# Patient Record
Sex: FEMALE | Race: BLACK OR AFRICAN AMERICAN | NOT HISPANIC OR LATINO | URBAN - METROPOLITAN AREA
[De-identification: names, ages, dates, MRNs, and addresses within clinical notes are randomized per-mention and may not be internally consistent; named-entity substitution may affect disease eponyms.]

---

## 2018-06-27 ENCOUNTER — EMERGENCY (EMERGENCY)
Facility: HOSPITAL | Age: 73
LOS: 1 days | Discharge: HOME | End: 2018-06-27
Attending: EMERGENCY MEDICINE
Payer: SELF-PAY

## 2018-06-27 VITALS
DIASTOLIC BLOOD PRESSURE: 106 MMHG | OXYGEN SATURATION: 99 % | TEMPERATURE: 99 F | SYSTOLIC BLOOD PRESSURE: 196 MMHG | RESPIRATION RATE: 17 BRPM | HEART RATE: 72 BPM

## 2018-06-27 VITALS
OXYGEN SATURATION: 99 % | TEMPERATURE: 98 F | WEIGHT: 190.04 LBS | DIASTOLIC BLOOD PRESSURE: 96 MMHG | HEART RATE: 76 BPM | HEIGHT: 67 IN | SYSTOLIC BLOOD PRESSURE: 203 MMHG | RESPIRATION RATE: 18 BRPM

## 2018-06-27 PROCEDURE — 76377 3D RENDER W/INTRP POSTPROCES: CPT

## 2018-06-27 PROCEDURE — 99284 EMERGENCY DEPT VISIT MOD MDM: CPT | Mod: 25

## 2018-06-27 PROCEDURE — 21310: CPT

## 2018-06-27 PROCEDURE — 70486 CT MAXILLOFACIAL W/O DYE: CPT | Mod: 26

## 2018-06-27 PROCEDURE — 70486 CT MAXILLOFACIAL W/O DYE: CPT

## 2018-06-27 PROCEDURE — 70450 CT HEAD/BRAIN W/O DYE: CPT

## 2018-06-27 PROCEDURE — 70450 CT HEAD/BRAIN W/O DYE: CPT | Mod: 26

## 2018-06-27 RX ORDER — AMLODIPINE BESYLATE 2.5 MG/1
5 TABLET ORAL ONCE
Qty: 0 | Refills: 0 | Status: COMPLETED | OUTPATIENT
Start: 2018-06-27 | End: 2018-06-27

## 2018-06-27 RX ORDER — AMLODIPINE BESYLATE 2.5 MG/1
1 TABLET ORAL
Qty: 30 | Refills: 0 | OUTPATIENT
Start: 2018-06-27 | End: 2018-07-26

## 2018-06-27 RX ADMIN — AMLODIPINE BESYLATE 5 MILLIGRAM(S): 2.5 TABLET ORAL at 19:40

## 2018-06-27 NOTE — ED PROVIDER NOTE - PROGRESS NOTE DETAILS
Dr. Sawyer Note: s/o from Dr. Peng pending ct scans which are reviewed with patient and copy given to patient and family, refer to plastics, no bleeding currently, appears well, stable for dc home. Dr. Sawyer Note: pt with noted elevated BP, pt was on BP meds last year but not anymore, will start pt on Norvasc, f/u pcp, no ACS/Neuro sxs, pt aware of risks and will f/u PCP.

## 2018-06-27 NOTE — ED PROVIDER NOTE - DATE/TIME 1
, patient received from day shift. Neuro; Alert and oriented to self situation but not all the time, do not know the , GCS; Eye; 4, verbal; 4, motor; 6.  Respiratory; Sat 100% on NC 4 L/min > decreased to 3 L/min, diminished lower lung sounds. Cardiac; NSR, 100 B/min, NIBP 112/71 mmHg,  GI; DM diet, Abd soft with active bowel sound,   Renal; void freely. Skin; cast at right leg  Endo; SSI Q 6 hr,   Lines; Left PICC,   Code; Full. Lab; WBC; 8.6, HGB; 9.5, platelet; 344 , Na; 388 , K; 3.7 , BUN; 19, Crea; 0.79 ,  DVT; SCD left leg & Lovenox. Plan; Titrate oxygen as tolerated, pain and agitation management CIWA 12, follow lab tomorrow, sitter at bedside for patient safety. ; CIWA now 5, to follow up.  0700. Patient been awake all the night, CIWA started at 12 in morning 4, Ativan 1 mg IV PRN for agitation been given twice, patient restlessness been less now, RA sat been above 95%. Need instruction to do incentive spirometry, kept calling her , once she said' I can not tolerate more give me morphine',  No lab today  Bedside and Verbal shift change report given to [address] [place] [address] (oncoming nurse) by Kenisha Coronado RN (offgoing nurse). Report included the following information SBAR, Kardex, Procedure Summary, Intake/Output, MAR, Accordion, Recent Results and Cardiac Rhythm NSR.        Problem: Falls - Risk of  Goal: *Absence of falls  Outcome: Progressing Towards Goal  .  Goal: *Knowledge of fall prevention  Outcome: Progressing Towards Goal  .    Problem: Patient Education: Go to Patient Education Activity  Goal: Patient/Family Education  Outcome: Progressing Towards Goal  .    Problem: Pressure Ulcer - Risk of  Goal: *Prevention of pressure ulcer  Outcome: Progressing Towards Goal  .    Problem: Patient Education: Go to Patient Education Activity  Goal: Patient/Family Education  Outcome: Progressing Towards Goal  .    Problem: Breathing Pattern - Ineffective  Goal: *Absence of hypoxia  Outcome: Progressing Towards Goal  .  Goal: *Use of effective breathing techniques  Outcome: Progressing Towards Goal  . 27-Jun-2018 19:18
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
## 2018-06-27 NOTE — ED PROVIDER NOTE - ENMT, MLM
Airway patent. Dried blood In nostril. No septal hematoma. Mouth with normal mucosa. Throat has no vesicles, no oropharyngeal exudates and uvula is midline. No pre-auricular ecchymosis or otorrhea. No rhinorrhea. Tender over nasal ridge c/ slight swelling s/ crepitus. No midface instability or jaw malalignment.

## 2018-06-27 NOTE — ED PROVIDER NOTE - CARE PLAN
Principal Discharge DX:	Closed head injury, initial encounter  Secondary Diagnosis:	Nasal bone fracture

## 2018-06-27 NOTE — ED PROVIDER NOTE - MUSCULOSKELETAL, MLM
Spine appears normal, range of motion is not limited, no muscle or joint tenderness. No midline c-spine ttp or palpable bony deformity. FROM.
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
## 2022-01-03 NOTE — ED PROVIDER NOTE - EYES, MLM
Detail Level: Detailed Depth Of Biopsy: dermis Was A Bandage Applied: Yes Size Of Lesion In Cm: 0.9 X Size Of Lesion In Cm: 0 Biopsy Type: H and E Biopsy Method: Dermablade Anesthesia Type: 1% lidocaine with epinephrine Anesthesia Volume In Cc: 0.8 Hemostasis: Electrodesiccation and Aluminum Chloride Wound Care: Petrolatum Dressing: bandage Destruction After The Procedure: No Type Of Destruction Used: Curettage Curettage Text: The wound bed was treated with curettage after the biopsy was performed. Cryotherapy Text: The wound bed was treated with cryotherapy after the biopsy was performed. Electrodesiccation Text: The wound bed was treated with electrodesiccation after the biopsy was performed. Electrodesiccation And Curettage Text: The wound bed was treated with electrodesiccation and curettage after the biopsy was performed. Silver Nitrate Text: The wound bed was treated with silver nitrate after the biopsy was performed. Lab: [id number] Lab Facility: [id number] Notes (To The Dermatopathologist): POOL Consent: Verbal consent was obtained and risks were reviewed including but not limited to scarring, infection, bleeding, scabbing, incomplete removal, nerve damage and allergy to anesthesia. Post-Care Instructions: I reviewed with the patient in detail post-care instructions. Patient is to keep the biopsy site dry overnight, and then apply bacitracin twice daily until healed. Patient may apply hydrogen peroxide soaks to remove any crusting. Notification Instructions: Patient will be notified of biopsy results. However, patient instructed to call the office if not contacted within 2 weeks. Billing Type: Third-Party Bill Information: Selecting Yes will display possible errors in your note based on the variables you have selected. This validation is only offered as a suggestion for you. PLEASE NOTE THAT THE VALIDATION TEXT WILL BE REMOVED WHEN YOU FINALIZE YOUR NOTE. IF YOU WANT TO FAX A PRELIMINARY NOTE YOU WILL NEED TO TOGGLE THIS TO 'NO' IF YOU DO NOT WANT IT IN YOUR FAXED NOTE. Clear bilaterally, pupils equal, round. No christen-orbital ecchymosis.
